# Patient Record
Sex: FEMALE | Race: WHITE
[De-identification: names, ages, dates, MRNs, and addresses within clinical notes are randomized per-mention and may not be internally consistent; named-entity substitution may affect disease eponyms.]

---

## 2022-11-04 ENCOUNTER — HOSPITAL ENCOUNTER (OUTPATIENT)
Dept: HOSPITAL 47 - RADCTMAIN | Age: 62
Discharge: HOME | End: 2022-11-04
Attending: UROLOGY
Payer: COMMERCIAL

## 2022-11-04 DIAGNOSIS — N32.89: Primary | ICD-10-CM

## 2022-11-04 PROCEDURE — 74176 CT ABD & PELVIS W/O CONTRAST: CPT

## 2022-11-04 NOTE — CT
EXAMINATION TYPE: CT abdomen pelvis wo con

 

DATE OF EXAM: 11/4/2022

 

COMPARISON: None

 

HISTORY: Hematuria with clots. Lower abdominal pressure. Burning while urinating. R/O kidney stone.

 

CT DLP: 698.7 mGycm

 

Examination of the solid and hollow viscera is limited given the lack of contrast.

 

FINDINGS: 

 

LUNG BASES: No evidence for nodule. No evidence for infiltrate.

 

LIVER/GB: The gallbladder is unremarkable. No space-occupying hepatic lesion.

 

PANCREAS: No pancreatic mass identified. No inflammatory process seen.

 

SPLEEN: No evidence for splenomegaly. No intrasplenic lesions seen.

 

ADRENALS: No adrenal nodules identified. No evidence for thickening.

 

KIDNEYS: No evidence for renal mass. No hydronephrosis. There is a 3.8 x 2.0 cm soft tissue mass with
in the urinary bladder to the right of midline which is suspicious for neoplasm. Direct visualization
 is advised. 2 mm nonobstructing calculus upper pole right kidney. 2 mm nonobstructing calculus mid t
o upper pole left kidney. 2 mm lower pole right-sided nonobstructing calculus.

 

BOWEL: Appendix has a normal appearance. No evidence of bowel obstruction. No inflammatory process.

 

Lymph nodes: No evidence for adenopathy greater than 1 cm.

 

Abdominal aorta: Atheromatous changes seen. No evidence for aneurysm.

 

Genital organs: No significant abnormality.

 

Other: No significant abnormality.

 

IMPRESSION: 

1.There is a 3.8 x 2.0 cm soft tissue mass within the urinary bladder to the right of midline which i
s suspicious for neoplasm. Direct visualization is advised.

## 2022-11-10 ENCOUNTER — HOSPITAL ENCOUNTER (OUTPATIENT)
Dept: HOSPITAL 47 - RADCTMAIN | Age: 62
Discharge: HOME | End: 2022-11-10
Attending: UROLOGY
Payer: COMMERCIAL

## 2022-11-10 DIAGNOSIS — N32.89: ICD-10-CM

## 2022-11-10 DIAGNOSIS — N20.0: Primary | ICD-10-CM

## 2022-11-10 PROCEDURE — 74178 CT ABD&PLV WO CNTR FLWD CNTR: CPT

## 2022-11-10 PROCEDURE — 74400 UROGRAPHY IV +-KUB TOMOG: CPT

## 2022-11-10 NOTE — CT
EXAMINATION TYPE: CT urogram wo/w con

CT DLP: 2239.6 mGycm, Automated exposure control for dose reduction was used.

 

DATE OF EXAM: 11/10/2022 3:00 PM

 

COMPARISON: CT abdomen pelvis most recent from 11/4/2022.

 

CLINICAL INDICATION:Female, 62 years old with history of R311; PHH, hematuria

 

TECHNIQUE:

 

Urogram of the abdomen and pelvis was performed before and after the uneventful administration of 100
 cc of Isovue-300 intravenously. Delayed imaging was obtained. Coronal and sagittal reformats were pe
rformed. One or more CT dose reduction strategies were utilized during this examination. 2D and 3D re
constructions are performed to assist visualization of the urinary tract on a separate workstation.

 

FINDINGS:

 

GENITOURINARY: 

 

RIGHT KIDNEY AND URETER: 2 nonobstructive right renal calculi with largest in the midportion measurin
g up to 3 mm. No hydronephrosis or hydroureter. No renal mass or other lesions. No urothelial lesions
: no filling defect, dilation, stricture or wall thickening.

 

LEFT KIDNEY AND URETER: Nonobstructive left lower pole 3 mm calculus. No hydronephrosis or hydrourete
r. No renal mass or other lesions. No urothelial lesions: no filling defect, dilation, stricture or w
all thickening.

 

URINARY BLADDER: There is a lobulated soft tissue enhancing mass along the right lateral aspect of th
e urinary bladder extending towards the base and posterior wall. This measures 5.4 x 2.6 cm and proje
cts into the bladder lumen (series 7, image 73). 

REPRODUCTIVE: Unremarkable. 

 

ABDOMEN

LIVER: Subcentimeter hypodense focus within the right hepatic lobe which is too small to characterize
. 

GALLBLADDER AND BILE DUCTS: Unremarkable

PANCREAS: Unremarkable. 

SPLEEN: Unremarkable.

ADRENAL GLANDS: Unremarkable.

STOMACH AND BOWEL: Unremarkable. The appendix is within normal limits. No evidence of bowel obstructi
on.

PERITONEUM:  No evidence of pneumoperitoneum, free fluid, or adenopathy.

VASCULATURE: No aortic aneurysm. Pelvic phleboliths.

MUSCULOSKELETAL: No acute osseous abnormalities. No aggressive osseous abnormality. Postsurgical dsouza
ges of the lumbar spine with bilateral pedicular screws and rods involving L3-L5. Hardware appears in
tact. Disc spacer at L4-L5.

SOFT TISSUE/ABDOMINAL WALL: Small fat filled umbilical hernia.

 

LOWER CHEST: No significant findings.

 

 

IMPRESSION:

1. Right urinary bladder mass measuring 5.4 x 2.6 cm. This is considered malignancy until proven othe
rwise. No CT evidence for distant metastasis. Direct visualization is recommended.

2. No renal mass or ureteral mass identified.

3. Nonobstructive bilateral renal calculi. No hydronephrosis.

## 2022-12-23 ENCOUNTER — HOSPITAL ENCOUNTER (OUTPATIENT)
Dept: HOSPITAL 47 - RADCTMAIN | Age: 62
Discharge: HOME | End: 2022-12-23
Attending: UROLOGY
Payer: COMMERCIAL

## 2022-12-23 DIAGNOSIS — R91.8: ICD-10-CM

## 2022-12-23 DIAGNOSIS — C67.9: Primary | ICD-10-CM

## 2022-12-23 PROCEDURE — 71260 CT THORAX DX C+: CPT

## 2022-12-23 NOTE — CT
EXAMINATION TYPE: CT chest w con

 

DATE OF EXAM: 12/23/2022

 

COMPARISON: None

 

HISTORY: h/o bladder CA

 

CT DLP: 356.8 mGycm

Automated exposure control for dose reduction was used.

 

CONTRAST: 

CT scan of the chest is performed with IV Contrast, patient injected with 70 mL of Isovue 300.

 

FINDINGS:

 

LUNGS: 6 mm right upper lobe pulmonary nodule image 34 sequence 4. 6 mm lingular nodule image 43 sequ
ence 4. Pleural-based right lower lobe pulmonary nodule measuring 8 mm image 22 sequence 4. No additi
onal pulmonary nodules seen. No evidence for infiltrate or volume loss. Parenchymal scarring right me
dial lung base.

 

MEDIASTINUM: There are no greater than 1 cm hilar or mediastinal lymph nodes.   No pericardial effusi
on is seen.  Thoracic aorta is of normal caliber. The heart is not enlarged.

 

UPPER ABDOMEN:  No significant abnormality appreciated.

 

OTHER:  No additional significant abnormality is seen.

 

IMPRESSION:

1. Nonspecific scattered pulmonary nodules. Follow-up study in 6 months is advised. Metastatic diseas
e cannot be excluded at this time.